# Patient Record
Sex: MALE | Race: WHITE | Employment: FULL TIME | URBAN - METROPOLITAN AREA
[De-identification: names, ages, dates, MRNs, and addresses within clinical notes are randomized per-mention and may not be internally consistent; named-entity substitution may affect disease eponyms.]

---

## 2018-12-03 ENCOUNTER — HOSPITAL ENCOUNTER (OUTPATIENT)
Dept: MRI IMAGING | Age: 48
Discharge: HOME | End: 2018-12-03
Payer: OTHER GOVERNMENT

## 2018-12-03 DIAGNOSIS — M54.50 LOW BACK PAIN WITH RADIATION, UNSPECIFIED LATERALITY: ICD-10-CM

## 2018-12-03 PROCEDURE — 72148 MRI LUMBAR SPINE W/O DYE: CPT

## 2018-12-21 ENCOUNTER — HOSPITAL ENCOUNTER (OUTPATIENT)
Dept: PHYSICAL THERAPY | Age: 48
Setting detail: THERAPIES SERIES
Discharge: HOME | End: 2018-12-21
Payer: OTHER GOVERNMENT

## 2018-12-21 PROCEDURE — G8978 MOBILITY CURRENT STATUS: HCPCS

## 2018-12-21 PROCEDURE — G8979 MOBILITY GOAL STATUS: HCPCS

## 2018-12-21 PROCEDURE — 97535 SELF CARE MNGMENT TRAINING: CPT

## 2018-12-21 PROCEDURE — 97162 PT EVAL MOD COMPLEX 30 MIN: CPT

## 2018-12-21 PROCEDURE — 97110 THERAPEUTIC EXERCISES: CPT

## 2018-12-21 ASSESSMENT — PAIN DESCRIPTION - LOCATION: LOCATION: BACK;LEG;BUTTOCKS

## 2018-12-21 ASSESSMENT — PAIN SCALES - GENERAL: PAINLEVEL_OUTOF10: 3

## 2018-12-21 ASSESSMENT — PAIN DESCRIPTION - DIRECTION: RADIATING_TOWARDS: DOWN BOTH LEGS TO FEET

## 2018-12-21 ASSESSMENT — PAIN DESCRIPTION - ONSET: ONSET: PROGRESSIVE

## 2018-12-21 ASSESSMENT — PAIN DESCRIPTION - PROGRESSION: CLINICAL_PROGRESSION: GRADUALLY WORSENING

## 2018-12-21 ASSESSMENT — PAIN DESCRIPTION - PAIN TYPE: TYPE: CHRONIC PAIN

## 2018-12-21 ASSESSMENT — PAIN DESCRIPTION - ORIENTATION: ORIENTATION: RIGHT;LEFT

## 2018-12-21 ASSESSMENT — PAIN DESCRIPTION - FREQUENCY: FREQUENCY: CONTINUOUS

## 2018-12-21 NOTE — PROGRESS NOTES
Progressive  Clinical Progression: Gradually worsening  Effect of Pain on Daily Activities: limits most everything currently, can't walk much or sit long or sleep, can't bend or lift  Patient's Stated Pain Goal: 3  Vital Signs  Patient Currently in Pain: Yes    Vision/Hearing  Vision  Vision: Impaired    Orientation  Orientation  Overall Orientation Status: Within Normal Limits    Social/Functional History  Social/Functional History  Lives With: Spouse  Type of Home: House  Occupation: Full time employment  Type of occupation: plant  [employer]  Leisure & Hobbies: bowling, get out w/ kids again    Objective     Observation/Palpation  Palpation: mod TTP B lumbar paraspinals w/ tightness up into T spine too  Observation: lateral and forward shift noted, to L , very antalgic gait noted    Spine  Lumbar: ext 10 w/ pain, flex fingertips to mid thigh, SB more limited to R w/ some shift again too    Strength RLE  Strength RLE: WFL  Strength LLE  Strength LLE: WFL  Strength Other  Other: poor core d/t pian, stabilizes self on table w/ hip flexor test     Additional Measures  Special Tests: standing sideglides no change,   Other: pat and achilles reflexes 1+ B    Assessment   Conditions Requiring Skilled Therapeutic Intervention  Body structures, Functions, Activity limitations: Decreased functional mobility ; Decreased ADL status; Decreased ROM; Decreased strength;Decreased high-level IADLs  Assessment: Pt is a 56 yo male who presents w/ LBP and B radicular symptoms. He demonstrates lateral shift, altered gait, limited ROM and strength, muscle spasm, pain and tenderness. These limitations are affecting his usual functional activity and he will benefit from PT to help reduce pain and restore alignment and usual function. Prior to Sept of this year he was managing his pain w/o limitations. Patient agrees with established plan of care and assisted in the development of their short term and long term goals.

## 2018-12-26 ENCOUNTER — HOSPITAL ENCOUNTER (OUTPATIENT)
Dept: PHYSICAL THERAPY | Age: 48
Setting detail: THERAPIES SERIES
Discharge: HOME | End: 2018-12-26
Payer: OTHER GOVERNMENT

## 2018-12-26 PROCEDURE — 97530 THERAPEUTIC ACTIVITIES: CPT

## 2018-12-26 PROCEDURE — G0283 ELEC STIM OTHER THAN WOUND: HCPCS

## 2018-12-26 NOTE — FLOWSHEET NOTE
Outpatient Physical Therapy           [place]           [x] Phone: [phone]   Fax: [phone]  [place]           [] Phone: [phone]   Fax: [phone]    Physical Therapy Daily Treatment Note  Date:  2018    Patient Name:  Sandra Cabrera    :  1970  MRN: [id number]  Restrictions/Precautions: Other position/activity restrictions: none  Diagnosis:   Diagnosis: back pain  Date of Surgery:   Treatment Diagnosis: Treatment Diagnosis: LBP, radicular pain, lateral shift     Insurance/Certification information: PT Insurance Information: [place], 14 approved: 12 follow ups, 1 eval and 1 re-eval   Referring Physician:  Referring Practitioner: Bertin Hernandez Doctor Visit:    Plan of care signed (Y/N):  pending  Visit# / total visits:    POC see approval above  Pain level: 5/10 at rest, 8/10 with movement       Goals:       Short term goals  Time Frame for Short term goals: 3 weeks, 19  Short term goal 1: Pt will be able to report at least 25% reduction in pain/symptoms or will refer to specialist  Short term goal 2: Pt will be able stand up straight w/o lateral shift at least 50% of the time  Long term goals  Time Frame for Long term goals : 6 weeks, 19  Long term goal 1: Pt will be indenpendent w/ HEP and able to continue to manage his pain on his own after PT  Long term goal 2: Pt will be able to perfrom usual work and functional activity w/ min pain  Long term goal 3: Pt will be able to walk normally again w/o shift or pain         Subjective:   Patient reports that things are the same. Pain in the low back, burning in B buttocks and pain going down both legs down to mid calf. Difficult to notice if exercises change radicular pain because the pain is so sporadic. Generally feels the same after exercises. Any changes in Ambulatory Summary Sheet? None       Objective:  Significant L lateral shift. Shift decreased to almost neutral position after side glides at wall.  Beginning

## 2018-12-28 ENCOUNTER — HOSPITAL ENCOUNTER (OUTPATIENT)
Dept: PHYSICAL THERAPY | Age: 48
Setting detail: THERAPIES SERIES
Discharge: HOME | End: 2018-12-28
Payer: OTHER GOVERNMENT

## 2018-12-28 PROCEDURE — 97110 THERAPEUTIC EXERCISES: CPT

## 2018-12-28 PROCEDURE — G0283 ELEC STIM OTHER THAN WOUND: HCPCS

## 2018-12-28 PROCEDURE — 97530 THERAPEUTIC ACTIVITIES: CPT

## 2018-12-28 NOTE — FLOWSHEET NOTE
get the extension he can tolerate and get centralization with. IFC w/ CP does seem to help too. Pt really struggles getting on/off bed. Exercises:  Exercise/Equipment 12/21/18 12/26/18 12/28/18 Date          Manual Rx as below x x x             PT side glides  Trial 2x ea - --    Self side glides at wall   R shoulder to wall 10x2 2*10 10x2 L shoulder on wall             prone lying  1' 2' 2' x3    Prone prop 1' 2' 1' x2    R S/L   With pillow under hips (folded in half) 2' -    Standing mimic prone lying and prop     1'x2  1'x2                                                                                                            Other Therapeutic Activities/Education: Encouraged patient to perform exercises every hour as possible    Home Exercise Program:  Issued, practiced and pt demo ability to perform 12/21/2018       Modality/intervention planned:    [x] Therapeutic Exercise  [x] Modalities:  [x] Therapeutic Activity     [] Ultrasound  [x] Elec  Stim  [] Gait Training      [] Cervical Traction [] Lumbar Traction  [x] Neuromuscular Re-education    [x] Cold/hotpack [] Iontophoresis   [x] Instruction in HEP      [] Vasopneumatic     [x] Manual Therapy               [] Aquatic Therapy     Manual Treatments:      Modalities:  IFC w/ CP to lumb/sacral in prone w/ table slightly elevated, 10'     Communication with other providers:  POC faxed 12/21/18    Education provided to patient/caregiver:  None     Adverse reactions to treatment:  None     Equipment provided:  None     Assessment:    Derrick tolerated session fair. He appeared in clinic with moderate L lateral shift. This corrected some with standing side glides at the wall and was able to get into prone some. Able to lay prone without shift. Pt continues w/ max pain and altered alignment and lack of understanding of our goals of exercise. He has slight pain reduction upon leaving and hopefully better plan for home including home TENS.   Pain 4-5/10

## 2019-01-02 ENCOUNTER — HOSPITAL ENCOUNTER (OUTPATIENT)
Dept: PHYSICAL THERAPY | Age: 49
Setting detail: THERAPIES SERIES
Discharge: HOME | End: 2019-01-02
Payer: OTHER GOVERNMENT

## 2020-08-05 ENCOUNTER — HOSPITAL ENCOUNTER (EMERGENCY)
Age: 50
Discharge: HOME | End: 2020-08-05
Payer: OTHER GOVERNMENT

## 2020-08-05 ENCOUNTER — ANESTHESIA (OUTPATIENT)
Dept: ENDOSCOPY | Age: 50
End: 2020-08-05
Payer: OTHER GOVERNMENT

## 2020-08-05 ENCOUNTER — ANESTHESIA EVENT (OUTPATIENT)
Dept: ENDOSCOPY | Age: 50
End: 2020-08-05
Payer: OTHER GOVERNMENT

## 2020-08-05 VITALS
OXYGEN SATURATION: 100 % | DIASTOLIC BLOOD PRESSURE: 82 MMHG | TEMPERATURE: 96.8 F | RESPIRATION RATE: 9 BRPM | SYSTOLIC BLOOD PRESSURE: 133 MMHG

## 2020-08-05 VITALS
WEIGHT: 250 LBS | RESPIRATION RATE: 16 BRPM | HEART RATE: 79 BPM | TEMPERATURE: 97.6 F | DIASTOLIC BLOOD PRESSURE: 82 MMHG | OXYGEN SATURATION: 98 % | BODY MASS INDEX: 37.03 KG/M2 | SYSTOLIC BLOOD PRESSURE: 136 MMHG | HEIGHT: 69 IN

## 2020-08-05 PROBLEM — T18.128A ESOPHAGEAL OBSTRUCTION DUE TO FOOD IMPACTION: Status: ACTIVE | Noted: 2020-08-05

## 2020-08-05 PROBLEM — K22.2 ESOPHAGEAL OBSTRUCTION DUE TO FOOD IMPACTION: Status: ACTIVE | Noted: 2020-08-05

## 2020-08-05 PROCEDURE — 99283 EMERGENCY DEPT VISIT LOW MDM: CPT

## 2020-08-05 PROCEDURE — 96374 THER/PROPH/DIAG INJ IV PUSH: CPT

## 2020-08-05 PROCEDURE — 2709999900 HC NON-CHARGEABLE SUPPLY: Performed by: SPECIALIST

## 2020-08-05 PROCEDURE — 2500000003 HC RX 250 WO HCPCS: Performed by: NURSE ANESTHETIST, CERTIFIED REGISTERED

## 2020-08-05 PROCEDURE — 2720000010 HC SURG SUPPLY STERILE: Performed by: SPECIALIST

## 2020-08-05 PROCEDURE — 2500000003 HC RX 250 WO HCPCS: Performed by: PHYSICIAN ASSISTANT

## 2020-08-05 PROCEDURE — 3700000000 HC ANESTHESIA ATTENDED CARE: Performed by: SPECIALIST

## 2020-08-05 PROCEDURE — 3609012900 HC EGD FOREIGN BODY REMOVAL: Performed by: SPECIALIST

## 2020-08-05 PROCEDURE — 7100000000 HC PACU RECOVERY - FIRST 15 MIN: Performed by: SPECIALIST

## 2020-08-05 PROCEDURE — 7100000001 HC PACU RECOVERY - ADDTL 15 MIN: Performed by: SPECIALIST

## 2020-08-05 PROCEDURE — 6360000002 HC RX W HCPCS: Performed by: NURSE ANESTHETIST, CERTIFIED REGISTERED

## 2020-08-05 PROCEDURE — 4500000028 HC INTERMEDIATE PROCEDURE

## 2020-08-05 PROCEDURE — 3700000001 HC ADD 15 MINUTES (ANESTHESIA): Performed by: SPECIALIST

## 2020-08-05 PROCEDURE — 2580000003 HC RX 258: Performed by: NURSE ANESTHETIST, CERTIFIED REGISTERED

## 2020-08-05 RX ORDER — ONDANSETRON 2 MG/ML
4 INJECTION INTRAMUSCULAR; INTRAVENOUS
Status: CANCELLED | OUTPATIENT
Start: 2020-08-05 | End: 2020-08-05

## 2020-08-05 RX ORDER — PROPOFOL 10 MG/ML
INJECTION, EMULSION INTRAVENOUS PRN
Status: DISCONTINUED | OUTPATIENT
Start: 2020-08-05 | End: 2020-08-05 | Stop reason: SDUPTHER

## 2020-08-05 RX ORDER — ROCURONIUM BROMIDE 10 MG/ML
INJECTION, SOLUTION INTRAVENOUS PRN
Status: DISCONTINUED | OUTPATIENT
Start: 2020-08-05 | End: 2020-08-05 | Stop reason: SDUPTHER

## 2020-08-05 RX ORDER — HYDRALAZINE HYDROCHLORIDE 20 MG/ML
5 INJECTION INTRAMUSCULAR; INTRAVENOUS EVERY 10 MIN PRN
Status: CANCELLED | OUTPATIENT
Start: 2020-08-05

## 2020-08-05 RX ORDER — LIDOCAINE HYDROCHLORIDE 20 MG/ML
INJECTION, SOLUTION INFILTRATION; PERINEURAL PRN
Status: DISCONTINUED | OUTPATIENT
Start: 2020-08-05 | End: 2020-08-05 | Stop reason: SDUPTHER

## 2020-08-05 RX ORDER — SODIUM CHLORIDE, SODIUM LACTATE, POTASSIUM CHLORIDE, CALCIUM CHLORIDE 600; 310; 30; 20 MG/100ML; MG/100ML; MG/100ML; MG/100ML
INJECTION, SOLUTION INTRAVENOUS CONTINUOUS PRN
Status: DISCONTINUED | OUTPATIENT
Start: 2020-08-05 | End: 2020-08-05 | Stop reason: SDUPTHER

## 2020-08-05 RX ORDER — FENTANYL CITRATE 50 UG/ML
25 INJECTION, SOLUTION INTRAMUSCULAR; INTRAVENOUS EVERY 5 MIN PRN
Status: CANCELLED | OUTPATIENT
Start: 2020-08-05

## 2020-08-05 RX ORDER — GLYCOPYRROLATE 1 MG/5 ML
SYRINGE (ML) INTRAVENOUS PRN
Status: DISCONTINUED | OUTPATIENT
Start: 2020-08-05 | End: 2020-08-05 | Stop reason: SDUPTHER

## 2020-08-05 RX ORDER — DEXAMETHASONE SODIUM PHOSPHATE 4 MG/ML
INJECTION, SOLUTION INTRA-ARTICULAR; INTRALESIONAL; INTRAMUSCULAR; INTRAVENOUS; SOFT TISSUE PRN
Status: DISCONTINUED | OUTPATIENT
Start: 2020-08-05 | End: 2020-08-05 | Stop reason: SDUPTHER

## 2020-08-05 RX ORDER — FENTANYL CITRATE 50 UG/ML
INJECTION, SOLUTION INTRAMUSCULAR; INTRAVENOUS PRN
Status: DISCONTINUED | OUTPATIENT
Start: 2020-08-05 | End: 2020-08-05 | Stop reason: SDUPTHER

## 2020-08-05 RX ORDER — ONDANSETRON 2 MG/ML
INJECTION INTRAMUSCULAR; INTRAVENOUS PRN
Status: DISCONTINUED | OUTPATIENT
Start: 2020-08-05 | End: 2020-08-05 | Stop reason: SDUPTHER

## 2020-08-05 RX ADMIN — GLUCAGON HYDROCHLORIDE 0.2 MG: KIT at 15:42

## 2020-08-05 RX ADMIN — FENTANYL CITRATE 100 MCG: 50 INJECTION INTRAMUSCULAR; INTRAVENOUS at 15:24

## 2020-08-05 RX ADMIN — DEXAMETHASONE SODIUM PHOSPHATE 4 MG: 4 INJECTION, SOLUTION INTRAMUSCULAR; INTRAVENOUS at 15:29

## 2020-08-05 RX ADMIN — ONDANSETRON 4 MG: 2 INJECTION INTRAMUSCULAR; INTRAVENOUS at 15:17

## 2020-08-05 RX ADMIN — SUGAMMADEX 200 MG: 100 INJECTION, SOLUTION INTRAVENOUS at 16:02

## 2020-08-05 RX ADMIN — Medication 0.4 MG: at 15:17

## 2020-08-05 RX ADMIN — PROPOFOL 200 MG: 10 INJECTION, EMULSION INTRAVENOUS at 15:24

## 2020-08-05 RX ADMIN — SODIUM CHLORIDE, POTASSIUM CHLORIDE, SODIUM LACTATE AND CALCIUM CHLORIDE: 600; 310; 30; 20 INJECTION, SOLUTION INTRAVENOUS at 15:16

## 2020-08-05 RX ADMIN — ROCURONIUM BROMIDE 50 MG: 10 INJECTION INTRAVENOUS at 15:24

## 2020-08-05 RX ADMIN — LIDOCAINE HYDROCHLORIDE 100 MG: 20 INJECTION, SOLUTION INFILTRATION; PERINEURAL at 15:24

## 2020-08-05 RX ADMIN — GLUCAGON HYDROCHLORIDE 2 MG: KIT at 11:46

## 2020-08-05 SDOH — HEALTH STABILITY: MENTAL HEALTH: HOW OFTEN DO YOU HAVE A DRINK CONTAINING ALCOHOL?: NEVER

## 2020-08-05 ASSESSMENT — PULMONARY FUNCTION TESTS
PIF_VALUE: 17
PIF_VALUE: 1
PIF_VALUE: 17
PIF_VALUE: 16
PIF_VALUE: 17
PIF_VALUE: 3
PIF_VALUE: 17
PIF_VALUE: 16
PIF_VALUE: 1
PIF_VALUE: 17
PIF_VALUE: 17
PIF_VALUE: 1
PIF_VALUE: 18
PIF_VALUE: 16
PIF_VALUE: 1
PIF_VALUE: 17
PIF_VALUE: 16
PIF_VALUE: 16
PIF_VALUE: 18
PIF_VALUE: 3
PIF_VALUE: 17
PIF_VALUE: 3
PIF_VALUE: 34
PIF_VALUE: 17
PIF_VALUE: 11
PIF_VALUE: 17
PIF_VALUE: 35
PIF_VALUE: 2
PIF_VALUE: 1
PIF_VALUE: 1
PIF_VALUE: 17
PIF_VALUE: 7
PIF_VALUE: 17
PIF_VALUE: 16
PIF_VALUE: 16
PIF_VALUE: 3
PIF_VALUE: 5
PIF_VALUE: 17
PIF_VALUE: 1
PIF_VALUE: 16
PIF_VALUE: 17
PIF_VALUE: 2
PIF_VALUE: 1
PIF_VALUE: 3
PIF_VALUE: 1
PIF_VALUE: 1
PIF_VALUE: 17
PIF_VALUE: 17

## 2020-08-05 ASSESSMENT — PAIN SCALES - GENERAL
PAINLEVEL_OUTOF10: 0
PAINLEVEL_OUTOF10: 3
PAINLEVEL_OUTOF10: 3
PAINLEVEL_OUTOF10: 0
PAINLEVEL_OUTOF10: 0

## 2020-08-05 ASSESSMENT — PAIN DESCRIPTION - DESCRIPTORS: DESCRIPTORS: PRESSURE

## 2020-08-05 ASSESSMENT — PAIN DESCRIPTION - LOCATION: LOCATION: NECK

## 2020-08-05 ASSESSMENT — PAIN DESCRIPTION - ORIENTATION: ORIENTATION: MID

## 2020-08-05 NOTE — ED NOTES
Bed: ED-14  Expected date:   Expected time:   Means of arrival:   Comments:  Shelah Gilford bed [employer], RN  08/05/20 [id number]

## 2020-08-05 NOTE — CONSULTS
Department of Internal Medicine  Gastroenterology Consult Note  Randell Concepcion MD      Reason for Consult:  Food bolus impaction    Primary Care Physician:  Minh Murray    History Obtained From:  patient    HISTORY OF PRESENT ILLNESS:              The patient is a 52 y.o.  male who presented to the ED with a history of getting chicken stuck in his esophagus last night and unable to swallow since. He had hiatal hernia surgery 3 weeks ago and had been on liquids. The ED provider contacted his surgeon in [place] and he requested that we proceed with EGD and food bolus removal    Past Medical History:    History reviewed. No pertinent past medical history. Past Surgical History:        Procedure Laterality Date    BACK SURGERY      HERNIA REPAIR         Medications Prior to Admission:    Prior to Admission medications    Not on File       Allergies:  No Known Allergies. Social History:    TOBACCO:  No  ETOH:  No    Family History:   History reviewed. No pertinent family history. REVIEW OF SYSTEMS: (POSITIVES WILL BE UNDERLINED)  CONSTITUTIONAL:    Weight change,fatigue, fever, chills  EYES:  Diplopia, change in vision  EARS:  hearing loss, tinnitus, vertigo  NOSE:   epistaxis  MOUTH/THROAT:     hoarseness, sore throat. RESPIRATORY:  SOB,  cough, sputum, hemoptysis  CARDIOVASCULAR : chest pain,palpitations, dyspnea exertion, orthopnea, paroxysmal nocturnal dyspnea, pedal edema. GASTROINTESTINAL:  See HPI  GENITOURINARY:   dysuria, hematuria, . HEMATOLOGIC/LYMPHATIC:   Anemia, bleeding tendencies.   MUSCULOSKELETAL:    myalgias,  joint pain  NEUROLOGICAL:   Loss of Consciousness, paresthesias, anesthesias, focal weakness  SKIN :   History of dermatitis, rashes  PSYCHIATRIC:  depression, , anxiety past psychosis  ENDOCRINE:  History of diabetes, thyroid disease  ALL/IMM : allergies, rashes    PHYSICAL EXAM:    Vitals:  /84   Pulse 71   Temp 98.8 °F (37.1 °C) (Oral)   Resp 16   Ht 5' 9\" (1.753 m)   Wt 250 lb (113.4 kg)   SpO2 99%   BMI 36.92 kg/m²   CONSTITUTIONAL: alert, cooperative, no apparent distress,   EYES:  pupils equal, round and reactive to light and sclera clear  ENT:  normocepalic, without obvious abnormality  NECK:  supple, symmetrical, trachea midline  HEMATOLOGIC/LYMPHATICS:  no cervical lymphadenopathy and no supraclavicular lymphadenopathy  LUNGS:  clear to auscultation  CARDIOVASCULAR:  regular rate and rhythm and no murmur noted  ABDOMEN:  Soft, non-tender with normal bowel sounds. No organomegaly or masses  NEUROLOGIC: no focal deficit detected  SKIN:  no lesions  EXTREMITIES: no clubbing, cyanosis, or edema    IMPRESSION:  1) recent anti-reflux surgery  2) food bolus impaction      RECOMMENDATIONS:  1) EGD with food bolus removal  2) ASA 2, airway 1          Khang Harris M.D

## 2020-08-05 NOTE — ED NOTES
Endoscopy called and are ready to do an EDG on this patient. Taken to Endoscopy per Ayana Mata RN.       Brenden Gutierrez RN  08/05/20 7691

## 2020-08-05 NOTE — ANESTHESIA POSTPROCEDURE EVALUATION
Department of Anesthesiology  Postprocedure Note    Patient: Mira Uribe  MRN: [id number]  YOB: 1970  Date of evaluation: 8/5/2020  Time:  4:12 PM     Procedure Summary     Date:  08/05/20 Room / Location:  [address]    Anesthesia Start:  3126 Anesthesia Stop:  1430    Procedure:  EGD FOREIGN BODY REMOVAL (N/A ) Diagnosis:  (food bolus)    Surgeon:  Trisha Rey MD Responsible Provider:  PIOTR Bah CRNA    Anesthesia Type:  general ASA Status:  2 - Emergent          Anesthesia Type: general    Aylin Phase I:      Aylin Phase II:      Last vitals: Reviewed and per EMR flowsheets.        Anesthesia Post Evaluation    Patient location during evaluation: PACU  Patient participation: complete - patient participated  Level of consciousness: awake and alert  Airway patency: patent  Nausea & Vomiting: no vomiting and no nausea  Complications: no  Cardiovascular status: blood pressure returned to baseline and hemodynamically stable  Respiratory status: acceptable, nonlabored ventilation, spontaneous ventilation and nasal cannula  Hydration status: stable

## 2020-08-05 NOTE — ED NOTES
Glucagon was given at 1146. Patient still reports symptoms of nausea/vomiting and pressure in throat.      Cliff Yadav RN  08/05/20 [id number]

## 2020-08-05 NOTE — ANESTHESIA PRE PROCEDURE
Department of Anesthesiology  Preprocedure Note       Name:  Jordi Johnston   Age:  52 y.o.  :  1970                                          MRN:  [id number]         Date:  2020      Surgeon: Alesha Byrd):  Terri Macias MD    Procedure: Procedure(s):  EGD FOREIGN BODY REMOVAL    Medications prior to admission:   Prior to Admission medications    Not on File       Current medications:    No current facility-administered medications for this encounter. Facility-Administered Medications Ordered in Other Encounters   Medication Dose Route Frequency Provider Last Rate Last Dose    glycopyrrolate (ROBINUL) injection    PRN Domo Erin, APRN - CRNA   0.4 mg at 20 1517    ondansetron (ZOFRAN) injection    PRN Domo Erin, APRN - CRNA   4 mg at 20 1517    lactated ringers infusion    Continuous PRN Domo Erin, APRN - CRNA        propofol injection    PRN Domo Rein, APRN - CRNA   200 mg at 20 1524    lidocaine 2 % injection    PRN Domo Erin, APRN - CRNA   100 mg at 20 1524    rocuronium (ZEMURON) injection    PRN Domo Erin, APRN - CRNA   50 mg at 20 1524    fentaNYL (SUBLIMAZE) injection    PRN Domo Erin, APRN - CRNA   100 mcg at 20 1524    dexamethasone (DECADRON) injection    PRN Domo Erin, APRN - CRNA   4 mg at 20 1529    glucagon (rDNA) injection    PRN Domo Erin, APRN - CRNA   0.2 mg at 20 1542       Allergies:  No Known Allergies    Problem List:  There is no problem list on file for this patient. Past Medical History:  History reviewed. No pertinent past medical history.     Past Surgical History:        Procedure Laterality Date    BACK SURGERY      HERNIA REPAIR         Social History:    Social History     Tobacco Use    Smoking status: Never Smoker   Substance Use Topics    Alcohol use: Never     Frequency: Never                                Counseling given: Not Answered      Vital Signs (Current):   Vitals:    08/05/20 1041   BP: 130/84   Pulse: 71   Resp: 16   Temp: 98.8 °F (37.1 °C)   TempSrc: Oral   SpO2: 99%   Weight: 250 lb (113.4 kg)   Height: 5' 9\" (1.753 m)                                              BP Readings from Last 3 Encounters:   08/05/20 130/84   08/05/20 130/88       NPO Status:                                                                                 BMI:   Wt Readings from Last 3 Encounters:   08/05/20 250 lb (113.4 kg)     Body mass index is 36.92 kg/m². CBC: No results found for: WBC, RBC, HGB, HCT, MCV, RDW, PLT    CMP: No results found for: NA, K, CL, CO2, BUN, CREATININE, GFRAA, AGRATIO, LABGLOM, GLUCOSE, PROT, CALCIUM, BILITOT, ALKPHOS, AST, ALT    POC Tests: No results for input(s): POCGLU, POCNA, POCK, POCCL, POCBUN, POCHEMO, POCHCT in the last 72 hours. Coags: No results found for: PROTIME, INR, APTT    HCG (If Applicable): No results found for: PREGTESTUR, PREGSERUM, HCG, HCGQUANT     ABGs: No results found for: PHART, PO2ART, YWN3PIB, SCU5LLF, BEART, Z5HETLXQ     Type & Screen (If Applicable):  No results found for: LABABO, LABRH    Drug/Infectious Status (If Applicable):  No results found for: HIV, HEPCAB    COVID-19 Screening (If Applicable): No results found for: COVID19      Anesthesia Evaluation    Airway: Mallampati: II  TM distance: >3 FB   Neck ROM: full  Mouth opening: > = 3 FB Dental:    (+) edentulous      Pulmonary:normal exam                               Cardiovascular:    (+) hypertension:,                   Neuro/Psych:               GI/Hepatic/Renal:   (+) hiatal hernia,           Endo/Other:                     Abdominal:           Vascular:                                        Anesthesia Plan      general     ASA 2 - emergent       Induction: intravenous. MIPS: Postoperative opioids intended. Anesthetic plan and risks discussed with patient. Plan discussed with CRNA.     Attending anesthesiologist reviewed and agrees with Kit Sharma MD   8/5/2020

## 2020-08-05 NOTE — ED TRIAGE NOTES
Pt to ED with complaints of a piece of chicken stuck in throat since last night. Pt states he recently had surgery on esophagus at [facility] and has been on a liquid diet but recently started adding solid foods. Denies any problems breathing and is still able to swallow saliva.

## 2020-08-05 NOTE — BRIEF OP NOTE
BRIEF EGD REPORT:     Photos and full EGD report available by going to [facility] review\" then \"procedures\" then  \"EGD\" then \"View Endoscopy Report\"     IMPRESSION :    1) food bolus impaction of distal esophagus- resolved as described above   2) S/P fundoplication   3) otherwise normal  PLAN :    he will folllow up with his surgeon, Dr Pebbles Kearns at [place] [address]

## 2020-08-05 NOTE — ED PROVIDER NOTES
EMERGENCY DEPARTMENT ENCOUNTER      PCP: [address]    Chief Complaint   Patient presents with    Swallowed Foreign Body       This patient was not evaluated by the attending physician. I have independently evaluated this patient. HPI    Ramos Yoder is a 52 y.o. male who presents with inability to swallow food or fluids since last night around 7 PM.  Patient had surgery to repair hiatal hernia approximately 3 weeks ago. Since that time he has been on fluids and puréed foods. He says that he was eating some chicken last night, swallowed it. He said he attended to and he felt like it lodged in his throat. Since that time he is not been able to tolerate fluids, vomiting up almost immediately. He has discomfort in his upper throat. He spoke to his surgeon who is at [facility] who told him to try to get to [address]  if possible however if he felt he was having trouble breathing or other issues to go to a close emergency department. Patient began to drive to [address] but felt unsafe so he came here to the emergency department. He still has a full and discomforting feeling in the upper chest, but no pain. No trouble breathing. REVIEW OF SYSTEMS    Constitutional:  Denies fever, chills, weight loss or weakness   HENT:  Denies sore throat or ear pain   Cardiovascular:  Denies chest pain, palpitations   Respiratory:  Denies cough or shortness of breath    GI: See HPI  :  Denies any urinary symptoms  Musculoskeletal:  Denies back pain  Skin:  Denies rash  Neurologic:  Denies headache, focal weakness or sensory changes   Endocrine:  Denies polyuria or polydypsia   Lymphatic:  Denies swollen glands     All other review of systems are negative  See HPI and nursing notes for additional information     PAST MEDICAL AND SURGICAL HISTORY    History reviewed. No pertinent past medical history.   Past Surgical History:   Procedure Laterality Date    BACK SURGERY      HERNIA REPAIR masses. Musculoskeletal:    There is no edema, asymmetry, or calf / thigh tenderness bilaterally. No cyanosis. No cool or pale-appearing limb. Distal cap refill and pulses intact bilateral upper and lower extremities  Bilateral upper and lower extremity ROM intact without pain or obvious deficit  Integument:  Warm, Dry  Neurologic: Alert & oriented , No focal deficits noted. Cranial nerves II through XII grossly intact. Normal gross motor coordination & motor strength bilateral upper and lower extremities  Sensation intact. Psychiatric:  Affect normal, Mood normal.       No results found for this visit on 08/05/20. No orders to display       ED [facility]       Patient presents as above for possible esophageal food impaction. He is tolerating oral secretions not oral fluids or food. Patient in no acute distress, reassuring physical exam and normal vital signs. Patient treated with 2 mg IV glucagon. After approximately half hour he trialed p.o. fluids and immediately vomited them up. Consultation placed to GI. Dr. Ryland Medina agrees to take the patient to endoscopy with approval from the patient's surgeon. I did speak with Dr. Evita Bailey at [facility] who says it is fine for Dr. Ryland Medina to do the scope. He recommends on discharge that the patient return to clear fluids for a few days and then thick fluids for a few days. They will follow-up with him in the next few days. Patient taken up to endoscopy at 3 PM. Returned at 5pm, according to nurses the procedure went well, the patient is feeling fine. Discussed follow up, return precautions etc with patient and he understands and agrees. Pt agrees to return for any new or worsening symptoms. Clinical  IMPRESSION    1.  Esophageal obstruction due to food impaction          Comment: Please note this report has been produced using speech recognition software and may contain errors related to that system including errors in grammar, punctuation, and spelling, as well as words and phrases that may be inappropriate. If there are any questions or concerns please feel free to contact the dictating provider for clarification.           Davina Bartholomew  [phone]

## 2020-08-05 NOTE — ED NOTES
Reviewed discharge information. Patient verbalized understanding and denied any questions. Discussed making follow-up care appointments with GI.      Ayana Mata RN  08/05/20 [id number]

## 2020-11-22 ENCOUNTER — APPOINTMENT (OUTPATIENT)
Dept: GENERAL RADIOLOGY | Age: 50
End: 2020-11-22
Payer: OTHER GOVERNMENT

## 2020-11-22 ENCOUNTER — HOSPITAL ENCOUNTER (EMERGENCY)
Age: 50
Discharge: HOME | End: 2020-11-22
Attending: EMERGENCY MEDICINE
Payer: OTHER GOVERNMENT

## 2020-11-22 VITALS
BODY MASS INDEX: 35.79 KG/M2 | DIASTOLIC BLOOD PRESSURE: 99 MMHG | HEIGHT: 70 IN | OXYGEN SATURATION: 98 % | RESPIRATION RATE: 16 BRPM | TEMPERATURE: 96.9 F | SYSTOLIC BLOOD PRESSURE: 177 MMHG | HEART RATE: 86 BPM | WEIGHT: 250 LBS

## 2020-11-22 LAB
ALBUMIN SERPL-MCNC: 3.8 GM/DL (ref 3.4–5)
ALP BLD-CCNC: 129 IU/L (ref 40–129)
ALT SERPL-CCNC: 22 U/L (ref 10–40)
ANION GAP SERPL CALCULATED.3IONS-SCNC: 10 MMOL/L (ref 4–16)
AST SERPL-CCNC: 25 IU/L (ref 15–37)
BASOPHILS ABSOLUTE: 0 K/CU MM
BASOPHILS RELATIVE PERCENT: 0.7 % (ref 0–1)
BILIRUB SERPL-MCNC: 0.3 MG/DL (ref 0–1)
BUN BLDV-MCNC: 16 MG/DL (ref 6–23)
CALCIUM SERPL-MCNC: 9.3 MG/DL (ref 8.3–10.6)
CHLORIDE BLD-SCNC: 103 MMOL/L (ref 99–110)
CO2: 26 MMOL/L (ref 21–32)
CREAT SERPL-MCNC: 0.9 MG/DL (ref 0.9–1.3)
DIFFERENTIAL TYPE: ABNORMAL
EOSINOPHILS ABSOLUTE: 0.4 K/CU MM
EOSINOPHILS RELATIVE PERCENT: 6.1 % (ref 0–3)
GFR AFRICAN AMERICAN: >60 ML/MIN/1.73M2
GFR NON-AFRICAN AMERICAN: >60 ML/MIN/1.73M2
GLUCOSE BLD-MCNC: 106 MG/DL (ref 70–99)
HCT VFR BLD CALC: 41 % (ref 42–52)
HEMOGLOBIN: 12.8 GM/DL (ref 13.5–18)
IMMATURE NEUTROPHIL %: 0.2 % (ref 0–0.43)
LYMPHOCYTES ABSOLUTE: 1.5 K/CU MM
LYMPHOCYTES RELATIVE PERCENT: 25.5 % (ref 24–44)
MCH RBC QN AUTO: 26.6 PG (ref 27–31)
MCHC RBC AUTO-ENTMCNC: 31.2 % (ref 32–36)
MCV RBC AUTO: 85.2 FL (ref 78–100)
MONOCYTES ABSOLUTE: 1 K/CU MM
MONOCYTES RELATIVE PERCENT: 17 % (ref 0–4)
PDW BLD-RTO: 17.2 % (ref 11.7–14.9)
PLATELET # BLD: 346 K/CU MM (ref 140–440)
PMV BLD AUTO: 9.2 FL (ref 7.5–11.1)
POTASSIUM SERPL-SCNC: 4 MMOL/L (ref 3.5–5.1)
RBC # BLD: 4.81 M/CU MM (ref 4.6–6.2)
SEGMENTED NEUTROPHILS ABSOLUTE COUNT: 3 K/CU MM
SEGMENTED NEUTROPHILS RELATIVE PERCENT: 50.5 % (ref 36–66)
SODIUM BLD-SCNC: 139 MMOL/L (ref 135–145)
TOTAL IMMATURE NEUTOROPHIL: 0.01 K/CU MM
TOTAL PROTEIN: 7.8 GM/DL (ref 6.4–8.2)
TROPONIN T: <0.01 NG/ML
WBC # BLD: 5.9 K/CU MM (ref 4–10.5)

## 2020-11-22 PROCEDURE — 85025 COMPLETE CBC W/AUTO DIFF WBC: CPT

## 2020-11-22 PROCEDURE — 93005 ELECTROCARDIOGRAM TRACING: CPT | Performed by: EMERGENCY MEDICINE

## 2020-11-22 PROCEDURE — 6370000000 HC RX 637 (ALT 250 FOR IP): Performed by: EMERGENCY MEDICINE

## 2020-11-22 PROCEDURE — 71045 X-RAY EXAM CHEST 1 VIEW: CPT

## 2020-11-22 PROCEDURE — 99285 EMERGENCY DEPT VISIT HI MDM: CPT

## 2020-11-22 PROCEDURE — 80053 COMPREHEN METABOLIC PANEL: CPT

## 2020-11-22 PROCEDURE — 84484 ASSAY OF TROPONIN QUANT: CPT

## 2020-11-22 RX ORDER — LISINOPRIL 10 MG/1
10 TABLET ORAL DAILY
COMMUNITY

## 2020-11-22 RX ORDER — NAPROXEN 500 MG/1
500 TABLET ORAL ONCE
Status: COMPLETED | OUTPATIENT
Start: 2020-11-22 | End: 2020-11-22

## 2020-11-22 RX ORDER — DULOXETIN HYDROCHLORIDE 60 MG/1
60 CAPSULE, DELAYED RELEASE ORAL DAILY
COMMUNITY

## 2020-11-22 RX ORDER — PREDNISONE 10 MG/1
60 TABLET ORAL ONCE
Status: COMPLETED | OUTPATIENT
Start: 2020-11-22 | End: 2020-11-22

## 2020-11-22 RX ORDER — METHYLPREDNISOLONE 4 MG/1
TABLET ORAL
Qty: 1 KIT | Refills: 0 | Status: SHIPPED | OUTPATIENT
Start: 2020-11-22

## 2020-11-22 RX ORDER — MAGNESIUM HYDROXIDE/ALUMINUM HYDROXICE/SIMETHICONE 120; 1200; 1200 MG/30ML; MG/30ML; MG/30ML
30 SUSPENSION ORAL ONCE
Status: COMPLETED | OUTPATIENT
Start: 2020-11-22 | End: 2020-11-22

## 2020-11-22 RX ADMIN — NAPROXEN 500 MG: 500 TABLET ORAL at 15:26

## 2020-11-22 RX ADMIN — PREDNISONE 60 MG: 10 TABLET ORAL at 15:26

## 2020-11-22 RX ADMIN — ALUMINUM HYDROXIDE, MAGNESIUM HYDROXIDE, AND SIMETHICONE 30 ML: 200; 200; 20 SUSPENSION ORAL at 15:26

## 2020-11-22 ASSESSMENT — PAIN DESCRIPTION - ORIENTATION: ORIENTATION: MID

## 2020-11-22 ASSESSMENT — PAIN DESCRIPTION - PAIN TYPE: TYPE: ACUTE PAIN

## 2020-11-22 ASSESSMENT — HEART SCORE: ECG: 0

## 2020-11-22 ASSESSMENT — PAIN DESCRIPTION - LOCATION: LOCATION: CHEST

## 2020-11-22 ASSESSMENT — PAIN DESCRIPTION - FREQUENCY: FREQUENCY: CONTINUOUS

## 2020-11-22 ASSESSMENT — PAIN SCALES - GENERAL: PAINLEVEL_OUTOF10: 3

## 2020-11-22 ASSESSMENT — PAIN DESCRIPTION - DESCRIPTORS: DESCRIPTORS: PRESSURE

## 2020-11-22 NOTE — ED PROVIDER NOTES
Emergency Department Encounter  Location: [address] [place] [address]    Patient: Nathalia Emmanuel  MRN: [id number]  : 1970  Date of evaluation: 2020  ED Provider: Saman Jean Baptiste DO, FACEP    Chief Complaint:    Cough; Chest Pain; and Shortness of Breath    [place]:  Nathalia Emmanuel is a 52 y.o. male that presents to the emergency department with complaints of chest tightness. The patient has a dry cough and some burning in his throat. Patient was diagnosed with COVID-19 on . He presents to the emergency department today with a dry cough the chest pain and slight shortness of breath. The patient called his [facility] and was told to come to the emergency department for a chest x-ray. Patient states he feels slightly short of breath. He has had no fever since the second day of his illness. He denies any nausea vomiting or diarrhea. Patient also gives a history of having some fullness around his clavicles since he had a hernia repair at [facility] 4 months ago. He has been back to his surgeon is had multiple CAT scans to determine if there is any problem but he states that this seems to be a slightly more prevalent since he has had the pressure in his chest.      ROS - see HPI, below listed is current ROS at time of my eval:  At least 10 systems reviewed and otherwise acutely negative except as in the [address]   General:  No fevers, no chills, no weakness  Eyes:  No recent vison changes, no discharge  ENT: Positive for burning in his throat, no nasal congestion, no hearing changes  Cardiovascular: Positive for chest pressure, no palpitations  Respiratory:  No shortness of breath, positive for dry cough, no wheezing  Gastrointestinal:  No pain, no nausea, no vomiting, no diarrhea  Musculoskeletal:  No muscle pain, no joint pain  Skin:  No rash, no pruritis, no easy bruising  Neurologic:  No speech problems, no headache, no extremity numbness, no extremity tingling, no extremity weakness  Psychiatric:  No anxiety  Genitourinary:  No dysuria, no hematuria  Endocrine:  No unexpected weight gain, no unexpected weight loss  Extremities:  no edema, no pain    Past Medical History:   Diagnosis Date    Back pain      Past Surgical History:   Procedure Laterality Date    BACK SURGERY      HERNIA REPAIR      UPPER GASTROINTESTINAL ENDOSCOPY N/A 8/5/2020    EGD FOREIGN BODY REMOVAL performed by Alaina Macario MD at [place] [address] reviewed. No pertinent family history.   Social History     Socioeconomic History    Marital status:      Spouse name: Not on file    Number of children: Not on file    Years of education: Not on file    Highest education level: Not on file   Occupational History    Not on file   Social Needs    Financial resource strain: Not on file    Food insecurity     Worry: Not on file     Inability: Not on file    Transportation needs     Medical: Not on file     Non-medical: Not on file   Tobacco Use    Smoking status: Never Smoker   Substance and Sexual Activity    Alcohol use: Yes     Frequency: Never     Comment: occ     Drug use: Never    Sexual activity: Not on file   Lifestyle    Physical activity     Days per week: Not on file     Minutes per session: Not on file    Stress: Not on file   Relationships    Social connections     Talks on phone: Not on file     Gets together: Not on file     Attends Mandaeism service: Not on file     Active member of club or organization: Not on file     Attends meetings of clubs or organizations: Not on file     Relationship status: Not on file    Intimate partner violence     Fear of current or ex partner: Not on file     Emotionally abused: Not on file     Physically abused: Not on file     Forced sexual activity: Not on file   Other Topics Concern    Not on file   Social History Narrative    Not on file     Current Facility-Administered Medications   Medication Dose Route Frequency Provider Last Rate Last Dose    aluminum & magnesium hydroxide-simethicone (MAALOX) 200-200-20 MG/5ML suspension 30 mL  30 mL Oral Once Ilean Mohit, DO        naproxen (NAPROSYN) tablet 500 mg  500 mg Oral Once Pradeep Sykes, DO        predniSONE (DELTASONE) tablet 60 mg  60 mg Oral Once Ilean Mohit, DO         Current Outpatient Medications   Medication Sig Dispense Refill    lisinopril (PRINIVIL;ZESTRIL) 10 MG tablet Take 10 mg by mouth daily      baclofen (LIORESAL) 0.05 MG/ML injection 50 mcg by Intrathecal route once      DULoxetine (CYMBALTA) 60 MG extended release capsule Take 60 mg by mouth daily      methylPREDNISolone (MEDROL, AARON,) 4 MG tablet Take by mouth. 1 kit 0     Not on File    Nursing Notes Reviewed    Physical Exam:  ED Triage Vitals [11/22/20 1401]   Enc Vitals Group      BP (!) 177/99      Pulse 86      Resp 16      Temp 96.9 °F (36.1 °C)      Temp Source Temporal      SpO2 98 %      Weight 250 lb (113.4 kg)      Height 5' 10\" (1.778 m)      Head Circumference       Peak Flow       Pain Score       Pain Loc       Pain Edu? Excl. in [address]? GENERAL APPEARANCE: Awake and alert. Cooperative. No acute distress. Nontoxic in appearance  HEAD: Normocephalic. Atraumatic. EYES: EOM's grossly intact. Sclera anicteric. ENT: Tolerates saliva. No trismus. Pharynx is erythematous without exudate  NECK: Supple. Trachea midline. Full range of motion is present  CARDIO: RRR. Radial pulse 2+. LUNGS: Respirations unlabored. CTAB. There is fullness in the periclavicle region of his anterior chest wall bilaterally but no bruising and is nontender to palpation  ABDOMEN: Soft. Non-distended. Non-tender. EXTREMITIES: No acute deformities. SKIN: Warm and dry. NEUROLOGICAL: No gross facial drooping. Moves all 4 extremities spontaneously. PSYCHIATRIC: Normal mood.      Labs:  Results for orders placed or performed during the hospital encounter of 11/22/20   CBC Auto Differential   Result Value Ref Range WBC 5.9 4.0 - 10.5 K/CU MM    RBC 4.81 4.6 - 6.2 M/CU MM    Hemoglobin 12.8 (L) 13.5 - 18.0 GM/DL    Hematocrit 41.0 (L) 42 - 52 %    MCV 85.2 78 - 100 FL    MCH 26.6 (L) 27 - 31 PG    MCHC 31.2 (L) 32.0 - 36.0 %    RDW 17.2 (H) 11.7 - 14.9 %    Platelets 989 993 - 008 K/CU MM    MPV 9.2 7.5 - 11.1 FL    Differential Type AUTOMATED DIFFERENTIAL     Segs Relative 50.5 36 - 66 %    Lymphocytes % 25.5 24 - 44 %    Monocytes % 17.0 (H) 0 - 4 %    Eosinophils % 6.1 (H) 0 - 3 %    Basophils % 0.7 0 - 1 %    Segs Absolute 3.0 K/CU MM    Lymphocytes Absolute 1.5 K/CU MM    Monocytes Absolute 1.0 K/CU MM    Eosinophils Absolute 0.4 K/CU MM    Basophils Absolute 0.0 K/CU MM    Immature Neutrophil % 0.2 0 - 0.43 %    Total Immature Neutrophil 0.01 K/CU MM   Comprehensive Metabolic Panel   Result Value Ref Range    Sodium 139 135 - 145 MMOL/L    Potassium 4.0 3.5 - 5.1 MMOL/L    Chloride 103 99 - 110 mMol/L    CO2 26 21 - 32 MMOL/L    BUN 16 6 - 23 MG/DL    CREATININE 0.9 0.9 - 1.3 MG/DL    Glucose 106 (H) 70 - 99 MG/DL    Calcium 9.3 8.3 - 10.6 MG/DL    Alb 3.8 3.4 - 5.0 GM/DL    Total Protein 7.8 6.4 - 8.2 GM/DL    Total Bilirubin 0.3 0.0 - 1.0 MG/DL    ALT 22 10 - 40 U/L    AST 25 15 - 37 IU/L    Alkaline Phosphatase 129 40 - 129 IU/L    GFR Non-African American >60 >60 mL/min/1.73m2    GFR African American >60 >60 mL/min/1.73m2    Anion Gap 10 4 - 16   Troponin   Result Value Ref Range    Troponin T <0.010 <0.01 NG/ML   EKG 12 Lead   Result Value Ref Range    Ventricular Rate 74 BPM    Atrial Rate 74 BPM    P-R Interval 154 ms    QRS Duration 96 ms    Q-T Interval 378 ms    QTc Calculation (Bazett) 419 ms    P Axis 25 degrees    R Axis 41 degrees    T Axis 21 degrees    Diagnosis       Normal sinus rhythm  Normal ECG  No previous ECGs available         EKG (if obtained): (All EKG's are interpreted by myself in the absence of a cardiologist)  The Ekg interpreted by me in the absence of a cardiologist shows.   normal sinus rhythm with a rate of 74  Axis is   normal  QTc is  419  Intervals and Durations are unremarkable. No specific ST-T wave changes appreciated. No evidence of acute ischemia. No prior EKG is available for comparison      Radiographs (if obtained):  [] The following radiograph was interpreted by myself in the absence of a radiologist:  [x] Radiologist's Report reviewed at time of ED visit:  XR CHEST PORTABLE   Final Result   No acute process. ED Course and MDM:  Patient presents to the emergency department with chest discomfort and burning in his throat. He has recently been diagnosed with COVID-19. 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 I see no evidence of acute MI. I have low clinical suspicion of a pulmonary embolism. There is no evidence of pneumonia. He will be discharged in stable condition to follow-up with his primary caregiver. He will be started on Medrol Dosepak. He is instructed return if his condition worsens. He is discharged in stable condition at this time. Final Impression:  1. Chest pain, unspecified type    2. History of 2019 novel coronavirus disease (COVID-19)      DISPOSITION Discharge - Pending Orders Complete    Patient referred to:  Courtney Kapadia  [address] [place] [address]  [phone]    Schedule an appointment as soon as possible for a visit in 1 week  For follow up    [address] [place] [address] Rd  [phone]    If symptoms worsen    Discharge medications:  New Prescriptions    METHYLPREDNISOLONE (MEDROL, AARON,) 4 MG TABLET    Take by mouth.      (Please note that portions of this note may have been completed with a voice recognition program. Efforts were made to edit the dictations but occasionally words are mis-transcribed.)    Alo Palacios DO, [facility]  Board certified in [address] [place] [address], [address] [place] [address]  11/22/20 152

## 2020-11-23 PROCEDURE — 93010 ELECTROCARDIOGRAM REPORT: CPT | Performed by: INTERNAL MEDICINE

## 2020-11-25 LAB
EKG ATRIAL RATE: 74 BPM
EKG DIAGNOSIS: NORMAL
EKG P AXIS: 25 DEGREES
EKG P-R INTERVAL: 154 MS
EKG Q-T INTERVAL: 378 MS
EKG QRS DURATION: 96 MS
EKG QTC CALCULATION (BAZETT): 419 MS
EKG R AXIS: 41 DEGREES
EKG T AXIS: 21 DEGREES
EKG VENTRICULAR RATE: 74 BPM

## (undated) DEVICE — THE TALON GRASPING DEVICE IS USED TO RETRIEVE OF FOREIGN BODIES, TISSUE SPECIMENS, STONES OR CALCULI IN ENDOSCOPIC PROCEDURES OF THE GASTROINTESTINAL TRACT.: Brand: TALON

## (undated) DEVICE — Z DISCONTINUED (USE MFG CAT MVABO)  TUBING GAS SAMPLING STD 6.5 FT FEMALE CONN SMRT CAPNOLINE

## (undated) DEVICE — RETRIEVER ENDOSCP UNIV 4X5.5 CM 2.5 MMX230 CM PLAT ROTH NET

## (undated) DEVICE — ACUSNARE POLYPECTOMY SNARE: Brand: ACUSNARE